# Patient Record
Sex: FEMALE | URBAN - METROPOLITAN AREA
[De-identification: names, ages, dates, MRNs, and addresses within clinical notes are randomized per-mention and may not be internally consistent; named-entity substitution may affect disease eponyms.]

---

## 2021-10-26 ENCOUNTER — APPOINTMENT (RX ONLY)
Dept: URBAN - METROPOLITAN AREA CLINIC 55 | Facility: CLINIC | Age: 61
Setting detail: DERMATOLOGY
End: 2021-10-26

## 2021-10-26 DIAGNOSIS — Z41.9 ENCOUNTER FOR PROCEDURE FOR PURPOSES OTHER THAN REMEDYING HEALTH STATE, UNSPECIFIED: ICD-10-CM

## 2021-10-26 PROCEDURE — ? NOVATHREADS

## 2021-10-26 PROCEDURE — ? FILLERS

## 2021-10-26 NOTE — PROCEDURE: FILLERS
Additional Area 3 Volume In Cc: 0
Include Cannula Size?: 27G
Include Cannula Information In Note?: No
Detail Level: Simple
Include Cannula Information In Note?: Yes
Filler: Restylane Kysse
Post-Care Instructions: After care instructions were provided to the patient.
Expiration Date (Month Year): 02/23
Anesthesia Type: 1% lidocaine with epinephrine
Map Statment: See Attach Map for Complete Details
Lot #: [id number]
Anesthesia Volume In Cc: 0.5
Consent: Written consent was obtained.

## 2021-10-26 NOTE — PROCEDURE: NOVATHREADS
Thread Size 5: 0
Add Another Thread?: no
Thread Type 1: Smooth Thread, Blunt Cannula
Detail Level: Zone
Pre-Procedure Text: The treatment areas were cleaned with alcohol and chlorhexidine. Insertion points were mapped and anesthetized.
Post-Care Instructions (For The Patient Hand-Out): Post care instructions were reviewed and a handout was provided.
Postcare Statement Text: There were no complications and the patient was given postcare instructions and ice packs.
Second Anesthesia Type: 1% lidocaine with epinephrine
Anesthesia Method: local infiltration
Procedure Text: An 27 gauge needle was used to create the insertions points and the NovaThreads cannulas with absorbable sutures were inserted subcutaneously as shown.
Number Of Threads: 16
Consent was obtained.
Location 1: lower eyelids

## 2022-02-25 ENCOUNTER — APPOINTMENT (RX ONLY)
Dept: URBAN - METROPOLITAN AREA CLINIC 55 | Facility: CLINIC | Age: 62
Setting detail: DERMATOLOGY
End: 2022-02-25

## 2022-02-25 DIAGNOSIS — Z41.9 ENCOUNTER FOR PROCEDURE FOR PURPOSES OTHER THAN REMEDYING HEALTH STATE, UNSPECIFIED: ICD-10-CM

## 2022-02-25 PROCEDURE — ? FILLERS

## 2022-02-25 PROCEDURE — ? NOVATHREADS

## 2022-02-25 NOTE — PROCEDURE: NOVATHREADS
Anesthesia Volume In Cc (Optional): 0
Postcare Statement Text: There were no complications and the patient was given postcare instructions and ice packs.
Anesthesia Type: 1% lidocaine with epinephrine
Number Of Threads: 16
Anesthesia Method: local infiltration
Add Another Thread?: no
Location 1: lower eyelids
Procedure Text: An 27 gauge needle was used to create the insertions points and the NovaThreads cannulas with absorbable sutures were inserted subcutaneously as shown.
Thread Type 1: Smooth Thread, Blunt Cannula
Post-Care Instructions (For The Patient Hand-Out): Post care instructions were reviewed and a handout was provided.
Detail Level: Zone
Pre-Procedure Text: The treatment areas were cleaned with alcohol and chlorhexidine. Insertion points were mapped and anesthetized.
Consent was obtained.

## 2022-02-25 NOTE — PROCEDURE: FILLERS
Tear Troughs Filler Volume In Cc: 0
Anesthesia Type: 1% lidocaine with epinephrine
Include Cannula Size?: 25G
Filler: Restylane Defyne
Use Map Statement For Sites (Optional): No
Include Cannula Size?: 27G
Detail Level: Simple
Include Cannula Information In Note?: Yes
Post-Care Instructions: After care instructions were provided to the patient.
Consent: Written consent was obtained.
Anesthesia Volume In Cc: 0.5
Expiration Date (Month Year): 11/2023
Lot #: [id number]
Map Statment: See Attach Map for Complete Details

## 2022-08-24 ENCOUNTER — APPOINTMENT (RX ONLY)
Dept: URBAN - METROPOLITAN AREA CLINIC 55 | Facility: CLINIC | Age: 62
Setting detail: DERMATOLOGY
End: 2022-08-24

## 2022-08-24 DIAGNOSIS — Z41.9 ENCOUNTER FOR PROCEDURE FOR PURPOSES OTHER THAN REMEDYING HEALTH STATE, UNSPECIFIED: ICD-10-CM

## 2022-08-24 PROCEDURE — ? FILLERS

## 2022-08-24 PROCEDURE — ? INVENTORY

## 2022-08-24 PROCEDURE — ? NOVATHREADS

## 2022-08-24 NOTE — PROCEDURE: NOVATHREADS
Thread Size 5: 0
Number Of Threads: 16
Add Another Thread?: no
Postcare Statement Text: There were no complications. The patient was given postcare instructions and ice packs.
Detail Level: Simple
Anesthesia Type: 2% lidocaine with epinephrine
Location 1: Lower Eyelid
Consent was obtained.
Thread Type 1: Smooth Thread, Blunt Cannula
Pre-Procedure Text: The treatment areas were prepped with alcohol or chlorhexidine. Insertion points were mapped and anesthetized.
Procedure Text: A 27 gauge needle was used to create the insertion site and the blunt NovaThreads cannulas with absorbable PDO suture were inserted subcutaneously as shown.
Third Anesthesia Type: 1% lidocaine with epinephrine
Needle Gauge 1: 29G

## 2022-08-24 NOTE — PROCEDURE: FILLERS
Nasolabial Folds Filler Volume In Cc: 0
Number Of Syringes (Required For Inventory): 1
Include Cannula Information In Note?: No
Include Cannula Information In Note?: Yes
Anesthesia Type: 1% lidocaine with epinephrine
Anesthesia Volume In Cc: 0.5
Consent was obtained.
Post-Care Instructions: Aftercare instructions were provided to the patient.
Include Cannula Size?: 27G
Filler: RHA 3
Inventory Information: This plan will send filler information to inventory based on the fillers you select. Multiple fillers can be sent but you must ensure you select the appropriate fillers in the inventory tab.
Detail Level: Detailed
Show Inventory Tab: Show
Map Statment: See Attach Map for Complete Details

## 2023-01-17 ENCOUNTER — APPOINTMENT (RX ONLY)
Dept: URBAN - METROPOLITAN AREA CLINIC 55 | Facility: CLINIC | Age: 63
Setting detail: DERMATOLOGY
End: 2023-01-17

## 2023-01-17 DIAGNOSIS — Z41.9 ENCOUNTER FOR PROCEDURE FOR PURPOSES OTHER THAN REMEDYING HEALTH STATE, UNSPECIFIED: ICD-10-CM

## 2023-01-17 PROCEDURE — ? FILLERS

## 2023-01-17 PROCEDURE — ? INVENTORY

## 2023-01-17 NOTE — PROCEDURE: FILLERS
Additional Area 4 Volume In Cc: 0
Consent was obtained.
Include Cannula Information In Note?: No
Post-Care Instructions: After care instructions were provided verbally and in writing.
Filler: RHA 4
Map Statment: See Attach Map for Complete Details
Anesthesia Type: 1% lidocaine with epinephrine
Include Cannula Information In Note?: Yes
Anesthesia Volume In Cc: 0.5
Include Cannula Size?: 27G
Detail Level: Detailed

## 2023-02-09 ENCOUNTER — APPOINTMENT (RX ONLY)
Dept: URBAN - METROPOLITAN AREA CLINIC 55 | Facility: CLINIC | Age: 63
Setting detail: DERMATOLOGY
End: 2023-02-09

## 2023-02-09 DIAGNOSIS — Z41.9 ENCOUNTER FOR PROCEDURE FOR PURPOSES OTHER THAN REMEDYING HEALTH STATE, UNSPECIFIED: ICD-10-CM

## 2023-02-09 PROCEDURE — ? INVENTORY

## 2023-02-09 PROCEDURE — ? COSMETIC FOLLOW-UP

## 2023-02-09 PROCEDURE — ? HYALURONIDASE INJECTION

## 2023-02-09 ASSESSMENT — LOCATION DETAILED DESCRIPTION DERM
LOCATION DETAILED: LEFT INFERIOR MEDIAL MALAR CHEEK
LOCATION DETAILED: RIGHT INFERIOR MEDIAL MALAR CHEEK

## 2023-02-09 ASSESSMENT — LOCATION ZONE DERM: LOCATION ZONE: FACE

## 2023-02-09 ASSESSMENT — LOCATION SIMPLE DESCRIPTION DERM
LOCATION SIMPLE: LEFT CHEEK
LOCATION SIMPLE: RIGHT CHEEK

## 2023-02-09 NOTE — PROCEDURE: HYALURONIDASE INJECTION
Consent: The risks of contour defects and dimpling of the skin were reviewed with the patient prior to the injection.
Detail Level: Detailed
Total Volume (Ccs): 0.4
Hyaluronidase Preparation: hyaluronidase

## 2023-02-09 NOTE — PROCEDURE: COSMETIC FOLLOW-UP
Comments (Free Text): Doesn’t like filler to cheeks and wants it dissolved. Her before and after photos were reviewed with the patient.  Her cheeks and eyes look dramatically better and there doesn’t appear to be a change in the size of her cheeks.  Her NLF also look dramatically better however when she smiles there is visible  her distal NLF that she doesn’t like.  We used Hylenex to decrease the fullness in this area.  I recommended that she return early next week to reassess and possibly add more Hylenex.
Detail Level: Zone

## 2023-09-19 ENCOUNTER — APPOINTMENT (RX ONLY)
Dept: URBAN - METROPOLITAN AREA CLINIC 55 | Facility: CLINIC | Age: 63
Setting detail: DERMATOLOGY
End: 2023-09-19

## 2023-09-19 DIAGNOSIS — Z41.9 ENCOUNTER FOR PROCEDURE FOR PURPOSES OTHER THAN REMEDYING HEALTH STATE, UNSPECIFIED: ICD-10-CM

## 2023-09-19 PROCEDURE — ? COSMETIC CONSULTATION: COOLSCULPTING

## 2023-09-19 PROCEDURE — ? INVENTORY

## 2023-09-19 PROCEDURE — ? BOTOX

## 2023-09-19 NOTE — PROCEDURE: BOTOX
[facility] Units: 0
Show Right And Left Brow Units: No
Incrementing Botox Units: By 0.5 Units
Show Depressor Anguli Units: Yes
Detail Level: Detailed
Forehead Units: 2
Show Inventory Tab: Show
Consent was obtained.
Post-Care Instructions: Patient instructed to not lie down for 4 hours and limit physical activity for 24 hours.
Dilution (U/0.1 Cc): 4

## 2023-12-21 ENCOUNTER — APPOINTMENT (RX ONLY)
Dept: URBAN - METROPOLITAN AREA CLINIC 55 | Facility: CLINIC | Age: 63
Setting detail: DERMATOLOGY
End: 2023-12-21

## 2023-12-21 DIAGNOSIS — Z41.9 ENCOUNTER FOR PROCEDURE FOR PURPOSES OTHER THAN REMEDYING HEALTH STATE, UNSPECIFIED: ICD-10-CM

## 2023-12-21 PROCEDURE — ? FILLERS

## 2023-12-21 PROCEDURE — ? INVENTORY

## 2023-12-21 PROCEDURE — ? NOVATHREADS

## 2023-12-21 PROCEDURE — ? BOTOX

## 2023-12-21 NOTE — PROCEDURE: NOVATHREADS
Anesthesia Type: 1% lidocaine with 1:100,000 epinephrine
Second Anesthesia Volume In Cc (Optional): 0
Add Another Thread?: no
Thread Type 1: Smooth Thread, Blunt Cannula
Number Of Threads: 16
Third Anesthesia Type: 1% lidocaine with epinephrine
Consent was obtained.
Detail Level: Simple
Procedure Text: A 27 gauge needle was used to create the insertion site and the blunt NovaThreads cannulas with absorbable PDO suture were inserted subcutaneously as shown.
Needle Gauge 1: 29G
Anesthesia Method: local infiltration
Postcare Statement Text: There were no complications. The patient was given postcare instructions and ice packs.
Pre-Procedure Text: The treatment areas were prepped with alcohol or chlorhexidine. Insertion points were mapped and anesthetized.

## 2023-12-21 NOTE — PROCEDURE: BOTOX
Right Pupillary Line Units: 0
Show Glabellar Units: Yes
Show Mentalis Units: No
Consent was obtained.
Detail Level: Detailed
Incrementing Botox Units: By 0.5 Units
Dilution (U/0.1 Cc): 4
Forehead Units: 6
Post-Care Instructions: Patient instructed to not lie down for 4 hours and limit physical activity for 24 hours.
Show Inventory Tab: Show

## 2024-05-16 ENCOUNTER — APPOINTMENT (RX ONLY)
Dept: URBAN - METROPOLITAN AREA CLINIC 55 | Facility: CLINIC | Age: 64
Setting detail: DERMATOLOGY
End: 2024-05-16

## 2024-05-16 DIAGNOSIS — Z41.9 ENCOUNTER FOR PROCEDURE FOR PURPOSES OTHER THAN REMEDYING HEALTH STATE, UNSPECIFIED: ICD-10-CM

## 2024-05-16 PROCEDURE — ? INVENTORY

## 2024-05-16 PROCEDURE — ? NOVATHREADS

## 2024-05-16 NOTE — PROCEDURE: NOVATHREADS
Thread Size 2: 0
Add Another Thread?: no
Thread Type 1: Smooth Thread, Blunt Cannula
Third Anesthesia Type: 1% lidocaine with epinephrine
Postcare Statement Text: There were no complications. The patient was given postcare instructions and ice packs.
Anesthesia Method: local infiltration
Pre-Procedure Text: The treatment areas were prepped with alcohol or chlorhexidine. Insertion points were mapped and anesthetized.
Needle Gauge 1: 29G
Consent was obtained.
Number Of Threads: 16
Detail Level: Simple
Anesthesia Type: 1% lidocaine with 1:100,000 epinephrine
Procedure Text: A 27 gauge needle was used to create the insertion site and the blunt NovaThreads cannulas with absorbable PDO suture were inserted subcutaneously as shown.

## 2024-07-18 ENCOUNTER — APPOINTMENT (RX ONLY)
Dept: URBAN - METROPOLITAN AREA CLINIC 55 | Facility: CLINIC | Age: 64
Setting detail: DERMATOLOGY
End: 2024-07-18

## 2024-07-18 DIAGNOSIS — Z41.9 ENCOUNTER FOR PROCEDURE FOR PURPOSES OTHER THAN REMEDYING HEALTH STATE, UNSPECIFIED: ICD-10-CM

## 2024-07-18 PROCEDURE — ? NOVATHREADS

## 2024-07-18 PROCEDURE — ? INVENTORY

## 2024-07-18 NOTE — PROCEDURE: NOVATHREADS
Location 1: lower eyelid
Add Another Thread?: no
Thread Type 1: Smooth Thread, Blunt Cannula
Anesthesia Volume In Cc (Optional): 0
Anesthesia Method: local infiltration
Consent was obtained.
Needle Gauge 1: 29G
Anesthesia Type: 1% lidocaine with 1:100,000 epinephrine
Pre-Procedure Text: The treatment areas were prepped with alcohol or chlorhexidine. Insertion points were mapped and anesthetized.
Procedure Text: A 27 gauge needle was used to create the insertion site and the blunt NovaThreads cannulas with absorbable PDO suture were inserted subcutaneously as shown.
Second Anesthesia Type: 1% lidocaine with epinephrine
Detail Level: Simple
Number Of Threads: 16
Postcare Statement Text: There were no complications. The patient was given postcare instructions and ice packs.

## 2024-12-06 ENCOUNTER — APPOINTMENT (OUTPATIENT)
Dept: URBAN - METROPOLITAN AREA CLINIC 55 | Facility: CLINIC | Age: 64
Setting detail: DERMATOLOGY
End: 2024-12-06

## 2024-12-06 DIAGNOSIS — Z41.9 ENCOUNTER FOR PROCEDURE FOR PURPOSES OTHER THAN REMEDYING HEALTH STATE, UNSPECIFIED: ICD-10-CM

## 2024-12-06 PROCEDURE — ? INVENTORY

## 2024-12-06 PROCEDURE — ? NOVATHREADS

## 2024-12-06 PROCEDURE — ? BOTOX

## 2024-12-06 PROCEDURE — ? FILLERS

## 2024-12-06 NOTE — PROCEDURE: FILLERS
Brows Filler Volume In Cc: 0
Include Cannula Information In Note?: No
Anesthesia Type: 1% lidocaine with epinephrine
Include Cannula Information In Note?: Yes
Anesthesia Volume In Cc: 0.5
Detail Level: Detailed
Consent was obtained.
Post-Care Instructions: After care instructions were provided verbally and in writing.
Filler: RHA 4
Map Statment: See Attach Map for Complete Details
Include Cannula Size?: 27G

## 2024-12-06 NOTE — PROCEDURE: BOTOX
Additional Area 6 Units: 0
Show Periorbital Units: Yes
Consent was obtained.
Show Ucl Units: No
Post-Care Instructions: Patient instructed to not lie down for 4 hours and limit physical activity for 24 hours.
Dilution (U/0.1 Cc): 4
Incrementing Botox Units: By 0.5 Units
Detail Level: Detailed
Forehead Units: 8
Show Inventory Tab: Show

## 2024-12-06 NOTE — PROCEDURE: NOVATHREADS
Add Another Thread?: no
Thread Size 3: 0
Thread Type 1: Smooth Thread, Blunt Cannula
Pre-Procedure Text: The treatment areas were prepped with alcohol or chlorhexidine. Insertion points were mapped and anesthetized.
Procedure Text: A 27 gauge needle was used to create the insertion site and the blunt NovaThreads cannulas with absorbable PDO suture were inserted subcutaneously as shown.
Anesthesia Type: 1% lidocaine with 1:100,000 epinephrine
Second Anesthesia Type: 1% lidocaine with epinephrine
Postcare Statement Text: There were no complications. The patient was given postcare instructions and ice packs.
Needle Gauge 1: 29G
Detail Level: Simple
Anesthesia Method: local infiltration
Consent was obtained.
Number Of Threads: 16

## 2025-04-15 ENCOUNTER — APPOINTMENT (OUTPATIENT)
Dept: URBAN - METROPOLITAN AREA CLINIC 55 | Facility: CLINIC | Age: 65
Setting detail: DERMATOLOGY
End: 2025-04-15

## 2025-04-15 DIAGNOSIS — Z41.9 ENCOUNTER FOR PROCEDURE FOR PURPOSES OTHER THAN REMEDYING HEALTH STATE, UNSPECIFIED: ICD-10-CM

## 2025-04-15 PROCEDURE — ? INVENTORY

## 2025-04-15 PROCEDURE — ? FILLERS

## 2025-04-15 NOTE — PROCEDURE: FILLERS
[place] Lines Filler Volume In Cc: 0
Map Statment: See Attach Map for Complete Details
Include Documentation That Aspiration Was Performed Prior To Injecting Filler:: No
Consent was obtained.
Anesthesia Type: 1% lidocaine with epinephrine
Post-Care Instructions: After care instructions were provided verbally and in writing.
Anesthesia Volume In Cc: 0.5
Include Cannula Information In Note?: Yes
Detail Level: Detailed
Include Cannula Size?: 25G
Include Cannula Size?: 27G
Filler: RHA 3